# Patient Record
Sex: FEMALE | Race: WHITE | Employment: OTHER | URBAN - METROPOLITAN AREA
[De-identification: names, ages, dates, MRNs, and addresses within clinical notes are randomized per-mention and may not be internally consistent; named-entity substitution may affect disease eponyms.]

---

## 2017-06-18 ENCOUNTER — APPOINTMENT (OUTPATIENT)
Dept: ULTRASOUND IMAGING | Age: 72
End: 2017-06-18
Attending: FAMILY MEDICINE
Payer: MEDICARE

## 2017-06-18 ENCOUNTER — HOSPITAL ENCOUNTER (OUTPATIENT)
Age: 72
Discharge: HOME | End: 2017-06-18
Attending: FAMILY MEDICINE
Payer: MEDICARE

## 2017-06-18 VITALS
OXYGEN SATURATION: 98 % | RESPIRATION RATE: 18 BRPM | SYSTOLIC BLOOD PRESSURE: 156 MMHG | HEART RATE: 63 BPM | DIASTOLIC BLOOD PRESSURE: 65 MMHG | TEMPERATURE: 97 F

## 2017-06-18 DIAGNOSIS — R60.0 LEG EDEMA, LEFT: ICD-10-CM

## 2017-06-18 DIAGNOSIS — I82.442 ACUTE DEEP VEIN THROMBOSIS (DVT) OF TIBIAL VEIN OF LEFT LOWER EXTREMITY (HCC): Primary | ICD-10-CM

## 2017-06-18 PROCEDURE — 99204 OFFICE O/P NEW MOD 45 MIN: CPT

## 2017-06-18 PROCEDURE — 99205 OFFICE O/P NEW HI 60 MIN: CPT

## 2017-06-18 PROCEDURE — 93971 EXTREMITY STUDY: CPT | Performed by: FAMILY MEDICINE

## 2017-06-18 RX ORDER — CHLORAL HYDRATE 500 MG
1000 CAPSULE ORAL DAILY
COMMUNITY

## 2017-06-18 NOTE — ED NOTES
Pt d/c with US copy. Pharmacy called to clarify Xarelto order per d/c paperwork. Instructions verified by Dr. Nicki Waters: Take 1 (15 mg) tablet by mouth BID x1 week. Pt verbalizes understanding.

## 2017-06-18 NOTE — ED PROVIDER NOTES
Patient Seen in: [facility]    History   Patient presents with:  Leg Swelling    Stated Complaint: LEG PAIN     HPI    This 65-year-old female presents to the office with worsening left leg pain and swelling.   The patient is her in HPI.     Physical Exam       ED Triage Vitals   BP 06/18/17 1215 174/60 mmHg   Pulse 06/18/17 1215 64   Resp 06/18/17 1215 18   Temp 06/18/17 1215 97.4 °F (36.3 °C)   Temp src 06/18/17 1215 Temporal   SpO2 06/18/17 1215 97 %   O2 Device 06/18/17 1215 Non and color flow. Doppler imaging were performed. The following veins were imaged:  Common, deep, and superficial femoral, popliteal, sapheno-femoral junction, posterior tibial veins, and the contralateral common femoral vein.   FINDINGS:  EXTREMITY EXAMINE visit  as soon as you get back home to [place]      Medications Prescribed:  Current Discharge Medication List    START taking these medications    rivaroxaban (XARELTO) 15 MG Oral Tab  Take 1 tablet (15 mg total) by mouth daily with food.   Qty: 14 table

## 2017-06-18 NOTE — ED INITIAL ASSESSMENT (HPI)
Noted L lower leg swelling and pain to calf today after 8 hour car drive. H/o DVT after giving childbirth. Denies CP or SOB at this time.

## (undated) NOTE — ED AVS SNAPSHOT
[facility] in [address] [place] [address] Drive,[address]    [facility]    Phone:  [phone]    Fax:  [phone] [facility] [address]   MRN: [id number]    Department:  [facility] in [facility]   Date of Visit:  6/18/2017 1 1/2-2 hours. Go to the closest emergency room if you develop chest pain, shortness of breath, worsening leg pain or swelling. Follow-up with your primary doctor once you get home to [place].   You will need to be on a blood thinner for at least 3 barb this physician (or your personal doctor if your instructions are to return to your personal doctor) about any new or lasting problems. The primary care or specialist physician will see patients referred from the [facility].  Follow-up care is at you to explore options for quitting.     - If you have concerns related to behavioral health issues or thoughts of harming yourself, contact [facility] at [phone].     - If you don’t have insurance, [facility] COMPRESSION:  Normal compressibility, phasicity, and augmentation. OTHER:  Negative. [employer]     Sign up for [employer], your secure online medical record.   [facility] [employer] will allow you to access patient instructions from your recent visit,  view other